# Patient Record
Sex: MALE | Race: OTHER | HISPANIC OR LATINO | URBAN - METROPOLITAN AREA
[De-identification: names, ages, dates, MRNs, and addresses within clinical notes are randomized per-mention and may not be internally consistent; named-entity substitution may affect disease eponyms.]

---

## 2020-12-09 ENCOUNTER — TRANSCRIBE ORDERS (OUTPATIENT)
Dept: URGENT CARE | Facility: MEDICAL CENTER | Age: 25
End: 2020-12-09

## 2020-12-09 ENCOUNTER — AMB VIDEO VISIT (OUTPATIENT)
Dept: OTHER | Facility: HOSPITAL | Age: 25
End: 2020-12-09

## 2020-12-09 DIAGNOSIS — Z11.59 SPECIAL SCREENING EXAMINATION FOR VIRAL DISEASE: ICD-10-CM

## 2020-12-09 DIAGNOSIS — Z11.59 SPECIAL SCREENING EXAMINATION FOR VIRAL DISEASE: Primary | ICD-10-CM

## 2020-12-09 PROCEDURE — EVISIT: Performed by: FAMILY MEDICINE

## 2020-12-09 PROCEDURE — U0003 INFECTIOUS AGENT DETECTION BY NUCLEIC ACID (DNA OR RNA); SEVERE ACUTE RESPIRATORY SYNDROME CORONAVIRUS 2 (SARS-COV-2) (CORONAVIRUS DISEASE [COVID-19]), AMPLIFIED PROBE TECHNIQUE, MAKING USE OF HIGH THROUGHPUT TECHNOLOGIES AS DESCRIBED BY CMS-2020-01-R: HCPCS | Performed by: FAMILY MEDICINE

## 2020-12-10 LAB — SARS-COV-2 RNA SPEC QL NAA+PROBE: NOT DETECTED

## 2022-02-19 ENCOUNTER — HOSPITAL ENCOUNTER (EMERGENCY)
Facility: HOSPITAL | Age: 27
Discharge: HOME | End: 2022-02-19
Attending: EMERGENCY MEDICINE | Admitting: EMERGENCY MEDICINE
Payer: COMMERCIAL

## 2022-02-19 VITALS
TEMPERATURE: 98.2 F | RESPIRATION RATE: 17 BRPM | OXYGEN SATURATION: 99 % | HEART RATE: 89 BPM | SYSTOLIC BLOOD PRESSURE: 99 MMHG | DIASTOLIC BLOOD PRESSURE: 59 MMHG

## 2022-02-19 DIAGNOSIS — R19.7 NAUSEA VOMITING AND DIARRHEA: ICD-10-CM

## 2022-02-19 DIAGNOSIS — K52.9 GASTROENTERITIS: Primary | ICD-10-CM

## 2022-02-19 DIAGNOSIS — R11.2 NAUSEA VOMITING AND DIARRHEA: ICD-10-CM

## 2022-02-19 LAB
ALBUMIN SERPL BCP-MCNC: 4.2 G/DL (ref 3.5–5)
ALP SERPL-CCNC: 74 U/L (ref 46–116)
ALT SERPL W P-5'-P-CCNC: 23 U/L (ref 12–78)
ANION GAP SERPL CALCULATED.3IONS-SCNC: 7 MMOL/L (ref 4–13)
AST SERPL W P-5'-P-CCNC: 20 U/L (ref 5–45)
BACTERIA UR QL AUTO: ABNORMAL /HPF
BASOPHILS # BLD AUTO: 0.05 THOUSANDS/ΜL (ref 0–0.1)
BASOPHILS NFR BLD AUTO: 0 % (ref 0–1)
BILIRUB SERPL-MCNC: 0.71 MG/DL (ref 0.2–1)
BILIRUB UR QL STRIP: NEGATIVE
BUN SERPL-MCNC: 12 MG/DL (ref 5–25)
CALCIUM SERPL-MCNC: 8.9 MG/DL (ref 8.3–10.1)
CHLORIDE SERPL-SCNC: 99 MMOL/L (ref 100–108)
CLARITY UR: CLEAR
CO2 SERPL-SCNC: 29 MMOL/L (ref 21–32)
COLOR UR: YELLOW
CREAT SERPL-MCNC: 1.26 MG/DL (ref 0.6–1.3)
EOSINOPHIL # BLD AUTO: 0 THOUSAND/ΜL (ref 0–0.61)
EOSINOPHIL NFR BLD AUTO: 0 % (ref 0–6)
ERYTHROCYTE [DISTWIDTH] IN BLOOD BY AUTOMATED COUNT: 11.9 % (ref 11.6–15.1)
FLUAV RNA RESP QL NAA+PROBE: NEGATIVE
FLUBV RNA RESP QL NAA+PROBE: NEGATIVE
GFR SERPL CREATININE-BSD FRML MDRD: 77 ML/MIN/1.73SQ M
GLUCOSE SERPL-MCNC: 112 MG/DL (ref 65–140)
GLUCOSE UR STRIP-MCNC: NEGATIVE MG/DL
HCT VFR BLD AUTO: 42.2 % (ref 36.5–49.3)
HGB BLD-MCNC: 14.6 G/DL (ref 12–17)
HGB UR QL STRIP.AUTO: ABNORMAL
IMM GRANULOCYTES # BLD AUTO: 0.11 THOUSAND/UL (ref 0–0.2)
IMM GRANULOCYTES NFR BLD AUTO: 1 % (ref 0–2)
KETONES UR STRIP-MCNC: ABNORMAL MG/DL
LEUKOCYTE ESTERASE UR QL STRIP: NEGATIVE
LIPASE SERPL-CCNC: 38 U/L (ref 73–393)
LYMPHOCYTES # BLD AUTO: 0.88 THOUSANDS/ΜL (ref 0.6–4.47)
LYMPHOCYTES NFR BLD AUTO: 5 % (ref 14–44)
MCH RBC QN AUTO: 31.1 PG (ref 26.8–34.3)
MCHC RBC AUTO-ENTMCNC: 34.6 G/DL (ref 31.4–37.4)
MCV RBC AUTO: 90 FL (ref 82–98)
MONOCYTES # BLD AUTO: 0.78 THOUSAND/ΜL (ref 0.17–1.22)
MONOCYTES NFR BLD AUTO: 5 % (ref 4–12)
NEUTROPHILS # BLD AUTO: 15.5 THOUSANDS/ΜL (ref 1.85–7.62)
NEUTS SEG NFR BLD AUTO: 89 % (ref 43–75)
NITRITE UR QL STRIP: NEGATIVE
NON-SQ EPI CELLS URNS QL MICRO: ABNORMAL /HPF
NRBC BLD AUTO-RTO: 0 /100 WBCS
PH UR STRIP.AUTO: 7 [PH] (ref 4.5–8)
PLATELET # BLD AUTO: 175 THOUSANDS/UL (ref 149–390)
PMV BLD AUTO: 12.4 FL (ref 8.9–12.7)
POTASSIUM SERPL-SCNC: 3.7 MMOL/L (ref 3.5–5.3)
PROT SERPL-MCNC: 8.4 G/DL (ref 6.4–8.2)
PROT UR STRIP-MCNC: NEGATIVE MG/DL
RBC # BLD AUTO: 4.69 MILLION/UL (ref 3.88–5.62)
RBC #/AREA URNS AUTO: ABNORMAL /HPF
SARS-COV-2 RNA RESP QL NAA+PROBE: NEGATIVE
SODIUM SERPL-SCNC: 135 MMOL/L (ref 136–145)
SP GR UR STRIP.AUTO: 1.01 (ref 1–1.03)
UROBILINOGEN UR QL STRIP.AUTO: 0.2 E.U./DL
WBC # BLD AUTO: 17.32 THOUSAND/UL (ref 4.31–10.16)
WBC #/AREA URNS AUTO: ABNORMAL /HPF

## 2022-02-19 PROCEDURE — 85025 COMPLETE CBC W/AUTO DIFF WBC: CPT | Performed by: PHYSICIAN ASSISTANT

## 2022-02-19 PROCEDURE — 99283 EMERGENCY DEPT VISIT LOW MDM: CPT

## 2022-02-19 PROCEDURE — 81001 URINALYSIS AUTO W/SCOPE: CPT

## 2022-02-19 PROCEDURE — 36415 COLL VENOUS BLD VENIPUNCTURE: CPT | Performed by: PHYSICIAN ASSISTANT

## 2022-02-19 PROCEDURE — 96375 TX/PRO/DX INJ NEW DRUG ADDON: CPT

## 2022-02-19 PROCEDURE — 87636 SARSCOV2 & INF A&B AMP PRB: CPT | Performed by: PHYSICIAN ASSISTANT

## 2022-02-19 PROCEDURE — 80053 COMPREHEN METABOLIC PANEL: CPT | Performed by: PHYSICIAN ASSISTANT

## 2022-02-19 PROCEDURE — 96361 HYDRATE IV INFUSION ADD-ON: CPT

## 2022-02-19 PROCEDURE — 83690 ASSAY OF LIPASE: CPT | Performed by: PHYSICIAN ASSISTANT

## 2022-02-19 PROCEDURE — 96374 THER/PROPH/DIAG INJ IV PUSH: CPT

## 2022-02-19 PROCEDURE — 99284 EMERGENCY DEPT VISIT MOD MDM: CPT | Performed by: PHYSICIAN ASSISTANT

## 2022-02-19 RX ORDER — ONDANSETRON 4 MG/1
4 TABLET, ORALLY DISINTEGRATING ORAL EVERY 6 HOURS PRN
Qty: 20 TABLET | Refills: 0 | Status: SHIPPED | OUTPATIENT
Start: 2022-02-19

## 2022-02-19 RX ORDER — ACETAMINOPHEN 325 MG/1
975 TABLET ORAL ONCE
Status: COMPLETED | OUTPATIENT
Start: 2022-02-19 | End: 2022-02-19

## 2022-02-19 RX ORDER — ONDANSETRON 2 MG/ML
4 INJECTION INTRAMUSCULAR; INTRAVENOUS ONCE
Status: COMPLETED | OUTPATIENT
Start: 2022-02-19 | End: 2022-02-19

## 2022-02-19 RX ORDER — KETOROLAC TROMETHAMINE 30 MG/ML
15 INJECTION, SOLUTION INTRAMUSCULAR; INTRAVENOUS ONCE
Status: COMPLETED | OUTPATIENT
Start: 2022-02-19 | End: 2022-02-19

## 2022-02-19 RX ADMIN — SODIUM CHLORIDE 1000 ML: 0.9 INJECTION, SOLUTION INTRAVENOUS at 01:37

## 2022-02-19 RX ADMIN — ACETAMINOPHEN 975 MG: 325 TABLET, FILM COATED ORAL at 01:36

## 2022-02-19 RX ADMIN — ONDANSETRON 4 MG: 2 INJECTION INTRAMUSCULAR; INTRAVENOUS at 01:37

## 2022-02-19 RX ADMIN — KETOROLAC TROMETHAMINE 15 MG: 30 INJECTION, SOLUTION INTRAMUSCULAR at 01:36

## 2022-02-19 NOTE — DISCHARGE INSTRUCTIONS
You were seen in the emergency department today for nausea, vomiting, and diarrhea  Please follow-up with your primary care physician regarding your symptoms  Return to the Emergency Department sooner if increased pain, fever, vomiting, diarrhea, difficulty breathing or urinating, bleeding  Clear fluids for 1-2 days and then advance diet as tolerated  Zofran as needed for nausea and vomiting  Plenty of fluids, bland diet

## 2022-02-19 NOTE — RESULT ENCOUNTER NOTE
Attempted to call regarding negative COVID/flu results  No Answer   Left generic message in Portuguese

## 2022-02-19 NOTE — ED NOTES
Patient rang out requesting food   Provided him with johny Albert and water     Feliz Grimes RN  02/19/22 6686

## 2022-02-19 NOTE — ED PROVIDER NOTES
History  Chief Complaint   Patient presents with    Fatigue     pt reports fatigue, generalized body aches, nausea, fever and chills, no medications PTA     Dylan Mcclain is a 27 y o   male with no significant pmh who presents to the emergency department with generalized body aches, chills, nausea, vomiting, and diarrhea  The patient is accompanied by significant other who provides Khmer translation at patients request  The patient states that on Sunday evening he started to have episodes of NBNB vomit as well as cramping abdominal pain  He states through the week he has had intermittent vomiting and abdominal pain with associated diarrhea beginning Monday/tuesday  Symptoms are intermittent  Last episode of vomit occurred 2 days prior  Diarrhea occurs about 2-3 times a day  Admits to a gradual onset headache that started earlier today  Headache is generalized and like a pressure  Like previous headaches  No vision changes, weakness, paresthesias  He states he has been able to tolerate some PO intake but limits himself out of fear of return of symptoms  Denies any blood in the vomit of stool  Admits to muscle aches and generally feeling run down  No medications prior to arrival  No urinary complaints   No known PMH            History provided by:  Patient   used: No    Vomiting  Severity:  Moderate  Duration:  5 days  Timing:  Intermittent  Number of daily episodes:  3-4  Quality:  Stomach contents  Able to tolerate:  Liquids and solids  Progression:  Worsening  Chronicity:  New  Recent urination:  Normal  Context: not post-tussive and not self-induced    Relieved by:  Nothing  Ineffective treatments:  None tried  Associated symptoms: abdominal pain, chills, headaches and myalgias    Associated symptoms: no arthralgias, no cough, no diarrhea, no fever, no sore throat and no URI    Abdominal pain:     Location:  Generalized    Quality: cramping      Severity:  Mild    Onset quality: Gradual    Timing:  Intermittent  Headaches:     Severity:  Mild    Onset quality:  Gradual    Duration:  1 day    Timing:  Constant    Progression:  Unchanged    Chronicity:  New  Myalgias:     Location:  Generalized    Quality:  Aching    Severity:  Mild    Onset quality:  Gradual    Timing:  Constant      None       History reviewed  No pertinent past medical history  No past surgical history on file  History reviewed  No pertinent family history  I have reviewed and agree with the history as documented  E-Cigarette/Vaping     E-Cigarette/Vaping Substances     Social History     Tobacco Use    Smoking status: Not on file    Smokeless tobacco: Not on file   Substance Use Topics    Alcohol use: Not on file    Drug use: Not on file       Review of Systems   Constitutional: Positive for chills  Negative for activity change, appetite change, fever and unexpected weight change  HENT: Negative for congestion, ear discharge, postnasal drip, rhinorrhea, sinus pressure, sinus pain and sore throat  Respiratory: Negative for apnea, cough, choking, chest tightness, shortness of breath, wheezing and stridor  Cardiovascular: Negative for chest pain, palpitations and leg swelling  Gastrointestinal: Positive for abdominal pain and vomiting  Negative for blood in stool, constipation, diarrhea and nausea  Genitourinary: Negative for dysuria, flank pain, frequency and urgency  Musculoskeletal: Positive for myalgias  Negative for arthralgias and neck stiffness  Skin: Negative for color change, pallor, rash and wound  Neurological: Positive for headaches  Negative for dizziness, tremors, seizures, syncope, light-headedness and numbness  All other systems reviewed and are negative  Physical Exam  Physical Exam  Vitals and nursing note reviewed  Constitutional:       General: He is not in acute distress  Appearance: Normal appearance  He is normal weight   He is not ill-appearing or toxic-appearing  HENT:      Head: Normocephalic and atraumatic  Nose: Nose normal       Mouth/Throat:      Mouth: Mucous membranes are moist       Pharynx: Oropharynx is clear  No oropharyngeal exudate  Eyes:      Pupils: Pupils are equal, round, and reactive to light  Cardiovascular:      Rate and Rhythm: Normal rate and regular rhythm  Pulses: Normal pulses  Heart sounds: Normal heart sounds  No murmur heard  No friction rub  No gallop  Pulmonary:      Effort: Pulmonary effort is normal  No respiratory distress  Breath sounds: Normal breath sounds  No stridor  No wheezing, rhonchi or rales  Abdominal:      General: Abdomen is flat  Bowel sounds are normal  There is no distension  Palpations: Abdomen is soft  There is no mass  Tenderness: There is no abdominal tenderness  There is no right CVA tenderness, left CVA tenderness, guarding or rebound  Hernia: No hernia is present  Musculoskeletal:         General: No swelling, tenderness or deformity  Normal range of motion  Cervical back: Normal range of motion  No rigidity or tenderness  Skin:     General: Skin is warm and dry  Capillary Refill: Capillary refill takes less than 2 seconds  Neurological:      General: No focal deficit present  Mental Status: He is alert and oriented to person, place, and time  Mental status is at baseline  Cranial Nerves: No cranial nerve deficit  Sensory: No sensory deficit  Motor: No weakness  Comments: GCS 15  CN 2-12 intact  PERRLA  Bilateral upper and lower extremities have 5/5 strength and sensation is intact  Finger to Nose and Heel to shin are intact   Speech normal                Vital Signs  ED Triage Vitals   Temperature Pulse Respirations Blood Pressure SpO2   02/19/22 0048 02/19/22 0048 02/19/22 0048 02/19/22 0048 02/19/22 0048   98 4 °F (36 9 °C) 105 16 102/55 99 %      Temp Source Heart Rate Source Patient Position - Orthostatic VS BP Location FiO2 (%)   02/19/22 0251 02/19/22 0251 02/19/22 0251 02/19/22 0251 --   Oral Monitor Sitting Left arm       Pain Score       02/19/22 0136       7           Vitals:    02/19/22 0048 02/19/22 0251   BP: 102/55 99/59   Pulse: 105 89   Patient Position - Orthostatic VS:  Sitting         Visual Acuity      ED Medications  Medications   sodium chloride 0 9 % bolus 1,000 mL (0 mL Intravenous Stopped 2/19/22 0251)   ondansetron (ZOFRAN) injection 4 mg (4 mg Intravenous Given 2/19/22 0137)   acetaminophen (TYLENOL) tablet 975 mg (975 mg Oral Given 2/19/22 0136)   ketorolac (TORADOL) injection 15 mg (15 mg Intravenous Given 2/19/22 0136)       Diagnostic Studies  Results Reviewed     Procedure Component Value Units Date/Time    Urine Microscopic [[id number]]  (Abnormal) Collected: 02/19/22 0142    Lab Status: Final result Specimen: Urine, Clean Catch Updated: 02/19/22 0238     RBC, UA 0-1 /hpf      WBC, UA None Seen /hpf      Epithelial Cells None Seen /hpf      Bacteria, UA None Seen /hpf     Comprehensive metabolic panel [[id number]]  (Abnormal) Collected: 02/19/22 0134    Lab Status: Final result Specimen: Blood from Arm, Right Updated: 02/19/22 0207     Sodium 135 mmol/L      Potassium 3 7 mmol/L      Chloride 99 mmol/L      CO2 29 mmol/L      ANION GAP 7 mmol/L      BUN 12 mg/dL      Creatinine 1 26 mg/dL      Glucose 112 mg/dL      Calcium 8 9 mg/dL      AST 20 U/L      ALT 23 U/L      Alkaline Phosphatase 74 U/L      Total Protein 8 4 g/dL      Albumin 4 2 g/dL      Total Bilirubin 0 71 mg/dL      eGFR 77 ml/min/1 73sq m     Narrative:      [place] guidelines for Chronic Kidney Disease (CKD):     Stage 1 with normal or high GFR (GFR > 90 mL/min/1 73 square meters)    Stage 2 Mild CKD (GFR = 60-89 mL/min/1 73 square meters)    Stage 3A Moderate CKD (GFR = 45-59 mL/min/1 73 square meters)    Stage 3B Moderate CKD (GFR = 30-44 mL/min/1 73 square meters)    Stage 4 Severe CKD (GFR = 15-29 mL/min/1 73 square meters)    Stage 5 End Stage CKD (GFR <15 mL/min/1 73 square meters)  Note: GFR calculation is accurate only with a steady state creatinine    Lipase [[id number]]  (Abnormal) Collected: 02/19/22 0134    Lab Status: Final result Specimen: Blood from Arm, Right Updated: 02/19/22 0207     Lipase 38 u/L     CBC and differential [[id number]]  (Abnormal) Collected: 02/19/22 0134    Lab Status: Final result Specimen: Blood from Arm, Right Updated: 02/19/22 0145     WBC 17 32 Thousand/uL      RBC 4 69 Million/uL      Hemoglobin 14 6 g/dL      Hematocrit 42 2 %      MCV 90 fL      MCH 31 1 pg      MCHC 34 6 g/dL      RDW 11 9 %      MPV 12 4 fL      Platelets 835 Thousands/uL      nRBC 0 /100 WBCs      Neutrophils Relative 89 %      Immat GRANS % 1 %      Lymphocytes Relative 5 %      Monocytes Relative 5 %      Eosinophils Relative 0 %      Basophils Relative 0 %      Neutrophils Absolute 15 50 Thousands/µL      Immature Grans Absolute 0 11 Thousand/uL      Lymphocytes Absolute 0 88 Thousands/µL      Monocytes Absolute 0 78 Thousand/µL      Eosinophils Absolute 0 00 Thousand/µL      Basophils Absolute 0 05 Thousands/µL     Urine Macroscopic, POC [[id number]]  (Abnormal) Collected: 02/19/22 0142    Lab Status: Final result Specimen: Urine Updated: 02/19/22 0143     Color, UA Yellow     Clarity, UA Clear     pH, UA 7 0     Leukocytes, UA Negative     Nitrite, UA Negative     Protein, UA Negative mg/dl      Glucose, UA Negative mg/dl      Ketones, UA 15 (1+) mg/dl      Urobilinogen, UA 0 2 E U /dl      Bilirubin, UA Negative     Blood, UA Small     Specific [place], UA 1 015    Narrative:      [facility] RESULT    COVID/FLU - 24 hour TAT [[id number]] Collected: 02/19/22 0134    Lab Status:  In process Specimen: Nares from Nose Updated: 02/19/22 0142                 No orders to display              Procedures  Procedures         ED Course                                             MDM  Number of Diagnoses or Management Options  Gastroenteritis: new and requires workup  Nausea vomiting and diarrhea: new and requires workup  Diagnosis management comments: Patient was seen and examined  in the emergency department for chief complaint of nausea, vomiting, diarrhea, generalized cramping abdominal pain, and myalgias  The patient presented with symptoms had been ongoing for since Sunday  Intermittent vomiting since Sunday  Intermittent diarrhea  Chills without fevers  Intermittent generalized cramping abdominal pain  Admits to mild headache that is like previous headaches  No blurry vision, weakness, paresthesias  No previous history of same  No sick contacts  Not vaccination against COVID  Distally were tolerate both solids and liquids with this care to eat a large meal out of fear of symptoms  On exam patient is in no acute distress, lungs are clear, regular rate and rhythm, no murmurs rubs or gallops  Abdomen soft nontender nondistended  No CVA tenderness  No rashes  DDx including but not limited to: food poisoning, viral illness, metabolic abnormality, dehydration,, GI etiology, UTI; doubt acute surgical intraabdominal process, Boorhave's syndrome, mediastinitis  Workup: Will check CBC, CMP, lipase, urinalysis, as COVID swab  Will treat symptomatically with Tylenol, Toradol, fluids  Mild leukocytosis  I did discuss follow-up imaging of the abdomen based on symptoms however patient declined this point is he is feeling much better and actually requesting food  Will p o  Challenge  P o  Challenge successful without any vomiting  I discussed return precautions for worsening abdominal pain, vomiting, diarrhea  Patient expressed understanding and remains committed to forgoing CT scan  At this point will discharge with PCP follow-up  Disposition:  General impression 24-year-old male with likely gastroenteritis causing nausea, vomiting, diarrhea    Patient symptomatically feeling much better at the time discharge and had successful p o  Challenge  Recommend follow-up with PCP for repeat exam and return for worsening symptoms  Patient declined further imaging at this point  Discharged in stable condition, he ambulated off the department  The patient was discharged in stable condition  Patient ambulated off the department  Extensive return to emergency department precautions were discussed  Follow up with appropriate providers including primary care physician was discussed  Patient and/or their  primary decision maker expressed understanding  Patient remained stable during entire emergency department stay  Amount and/or Complexity of Data Reviewed  Clinical lab tests: ordered and reviewed  Tests in the medicine section of CPT®: ordered and reviewed  Review and summarize past medical records: yes    Risk of Complications, Morbidity, and/or Mortality  Presenting problems: moderate  Diagnostic procedures: low  Management options: low    Patient Progress  Patient progress: stable      Disposition  Final diagnoses:   Gastroenteritis   Nausea vomiting and diarrhea     Time reflects when diagnosis was documented in both MDM as applicable and the Disposition within this note     Time User Action Codes Description Comment    2/19/2022  2:50 AM Kylie Kang [K52 9] Gastroenteritis     2/19/2022  2:50 AM Kylie Fair Add [R11 2,  R19 7] Nausea vomiting and diarrhea       ED Disposition     ED Disposition Condition Date/Time Comment    Discharge Stable Sat Feb 19, 2022  2:50 AM Yusef Ferrer discharge to home/self care              Follow-up Information     Follow up With Specialties Details Why Contact Info Additional [address] [facility] Emergency Medicine Go to  As needed, If symptoms worsen [place] [id number]  [address] [facility], [address] , [place], [place], [place]  Call  To schedule an appointment with a primary care physician [phone]             Discharge Medication List as of 2/19/2022  2:54 AM      START taking these medications    Details   ondansetron (Zofran ODT) 4 mg disintegrating tablet Take 1 tablet (4 mg total) by mouth every 6 (six) hours as needed for nausea or vomiting, Starting Sat 2/19/2022, Normal             No discharge procedures on file      PDMP Review     None          ED Provider  Electronically Signed by           Jo Ann Delgadillo PA-C  02/19/22 5689

## 2022-02-20 ENCOUNTER — OFFICE VISIT (OUTPATIENT)
Dept: URGENT CARE | Age: 27
End: 2022-02-20
Payer: COMMERCIAL

## 2022-02-20 VITALS
HEIGHT: 69 IN | WEIGHT: 128 LBS | RESPIRATION RATE: 18 BRPM | TEMPERATURE: 101 F | HEART RATE: 82 BPM | BODY MASS INDEX: 18.96 KG/M2 | OXYGEN SATURATION: 97 %

## 2022-02-20 DIAGNOSIS — J02.9 SORE THROAT: ICD-10-CM

## 2022-02-20 DIAGNOSIS — J03.00 ACUTE STREPTOCOCCAL TONSILLITIS, NOT SPECIFIED AS RECURRENT OR NOT: Primary | ICD-10-CM

## 2022-02-20 LAB — S PYO AG THROAT QL: POSITIVE

## 2022-02-20 PROCEDURE — 99213 OFFICE O/P EST LOW 20 MIN: CPT | Performed by: PHYSICIAN ASSISTANT

## 2022-02-20 PROCEDURE — 87880 STREP A ASSAY W/OPTIC: CPT | Performed by: PHYSICIAN ASSISTANT

## 2022-02-20 RX ORDER — AMOXICILLIN 500 MG/1
500 CAPSULE ORAL EVERY 12 HOURS SCHEDULED
Qty: 20 CAPSULE | Refills: 0 | Status: SHIPPED | OUTPATIENT
Start: 2022-02-20 | End: 2022-03-02

## 2022-02-21 NOTE — PROGRESS NOTES
[employer]        NAME: Sarbjit Castillo is a 27 y o  male  : 1995    MRN: [id number]  DATE: 2022  TIME: 8:33 AM    Assessment and Plan   Acute streptococcal tonsillitis, not specified as recurrent or not [J03 00]  1  Acute streptococcal tonsillitis, not specified as recurrent or not  amoxicillin (AMOXIL) 500 mg capsule    dexamethasone oral liquid 10 mg 1 mL   2  Sore throat  POCT rapid strepA   Pt presents with complaints of sore throat and examination concerning for strep  Rapid strep in the clinic is positive  Pt will be started on Amoxicillin to treat as this is the treatment of choice for strep  Pt allergies were reviewed and they have no allergy to penicillins  We also discussed Decadron and associated side effects to help reduce tonsilar swelling and throat pain  Pt is instructed that they are considered contagious until they have been on antibiotics for 24 hours  They should not attend work or school during that time  The patient is provided with a note(s) to this effect  We discussed that after they have been on antibiotics 48 hours they need to throw away their current toothbrush and replace with a new toothbrush to prevent re-colonizing themselves with strep bacteria  It was discussed with the patient that I cannot rule out more serious conditions such as peritonsilar, tonsilar, and retropharyngeal abscess at this location that would require ED evaluation; however serious, these are rare conditions and unlikely based on my examination and I do not recommend ED evaluation at this time  The patient will follow-up with their PCP in 2-3 days if symptoms are not improved on antibiotics  They will report to the emergency room if symptoms worsen or new symptoms such as jaw pain, difficulty swallowing, anterior neck pain, or hoarseness of voice develop  Patient Instructions     Patient Instructions   Take antibiotics as directed     You are contagious until you have been on the antibiotics for 24 hours, do not share drink, food, or kiss on the mouth during that time  Get a new toothbrush after you have been on the antibiotics for 2 days so you do not recolonize yourself with bacteria  If symptoms do not improve on antibiotics in 2-3 days, you get a rash on the antibiotic, or develop jaw pain, worsening neck pain, swelling of the neck, or difficulty swallowing, report to the emergency department  Strep Throat   AMBULATORY CARE:   Strep throat  is a throat infection caused by bacteria  It is easily spread from person to person  Common symptoms include the following:   · Sore, red, and swollen throat    · Fever and headache     · Upset stomach, abdominal pain, or vomiting    · White or yellow patches or blisters in the back of your throat    · Tender, swollen lumps on the sides of your neck or jaw    · Throat pain when you swallow    Call 911 for any of the following:   · You have trouble breathing  Seek care immediately if:   · You have new symptoms like a bad headache, stiff neck, chest pain, or vomiting  · You are drooling because you cannot swallow your spit  Contact your healthcare provider if:   · You have a fever  · You have a rash or ear pain  · You have green, yellow-brown, or bloody mucus when you cough or blow your nose  · You are unable to drink anything  · You have questions or concerns about your condition or care  Treatment for strep throat  may include antibiotic medicine to treat your strep throat  You should feel better within 2 to 3 days after you start antibiotics  You may return to work or school 24 hours after you start antibiotics  Manage strep throat:   · Use lozenges, ice, soft foods, or popsicles  to soothe your throat  · Drink juice, milk shakes, or soup  if your throat is too sore to eat solid food  Drinking liquids can also help prevent dehydration  · Gargle with salt water    Mix ¼ teaspoon salt in a glass of warm water and gargle  This may help reduce swelling in your throat  · Do not smoke  Nicotine and other chemicals in cigarettes and cigars can cause lung damage and make your symptoms worse  Ask your healthcare provider for information if you currently smoke and need help to quit  E-cigarettes or smokeless tobacco still contain nicotine  Talk to your healthcare provider before you use these products  Prevent the spread of strep throat:   · Wash your hands often  Use soap and water  Wash your hands after you use the bathroom, change a child's diapers, or sneeze  Wash your hands before you prepare or eat food  · Do not share food or drinks  Replace your toothbrush after you have taken antibiotics for 24 hours  Follow up with your doctor as directed:  Write down your questions so you remember to ask them during your visits  © Copyright [employer] 2021 Information is for End User's use only and may not be sold, redistributed or otherwise used for commercial purposes  All illustrations and images included in CareNotes® are the copyrighted property of [employer] Inc  or [facility]  The above information is an  only  It is not intended as medical advice for individual conditions or treatments  Talk to your doctor, nurse or pharmacist before following any medical regimen to see if it is safe and effective for you  Follow up with PCP in 3-5 days  Proceed to  ER if symptoms worsen  Chief Complaint     Chief Complaint   Patient presents with    Sore Throat     sore throat began yesterday         History of Present Illness       24-year-old male presents with complaint of sore throat that began this morning  Patient reports that he has had fevers off and on for the last week  He also has a mild cough and had some congestion last week  Patient was tested for COVID yesterday evening in the ER and was negative for both COVID and flu    Patient notes there are several spots on his tonsils and he is having a lot of pain swallowing  Patient denies difficulty swallowing and voice changes as well as jaw pain  He feels that his glands are swollen and is having some mild anterior neck pain  No other concerns or complaints today  Review of Systems   Review of Systems   Constitutional: Positive for fatigue and fever  HENT: Positive for sore throat  Negative for congestion, drooling, postnasal drip, rhinorrhea, trouble swallowing and voice change  Respiratory: Negative for cough and shortness of breath  Cardiovascular: Negative for chest pain  Gastrointestinal: Positive for nausea  Negative for diarrhea and vomiting  Musculoskeletal: Negative for myalgias  Neurological: Negative for headaches  Current Medications       Current Outpatient Medications:     ondansetron (Zofran ODT) 4 mg disintegrating tablet, Take 1 tablet (4 mg total) by mouth every 6 (six) hours as needed for nausea or vomiting, Disp: 20 tablet, Rfl: 0    amoxicillin (AMOXIL) 500 mg capsule, Take 1 capsule (500 mg total) by mouth every 12 (twelve) hours for 10 days, Disp: 20 capsule, Rfl: 0  No current facility-administered medications for this visit  Current Allergies     Allergies as of 02/20/2022    (No Known Allergies)            The following portions of the patient's history were reviewed and updated as appropriate: allergies, current medications, past family history, past medical history, past social history, past surgical history and problem list      History reviewed  No pertinent past medical history  History reviewed  No pertinent surgical history  History reviewed  No pertinent family history  Medications have been verified  Objective   Pulse 82   Temp (!) 101 °F (38 3 °C)   Resp 18   Ht 5' 9" (1 753 m)   Wt 58 1 kg (128 lb)   SpO2 97%   BMI 18 90 kg/m²   No LMP for male patient  Physical Exam     Physical Exam  Vitals and nursing note reviewed  Constitutional:       General: He is awake  He is not in acute distress  Appearance: Normal appearance  He is well-developed and well-groomed  He is not ill-appearing, toxic-appearing or diaphoretic  HENT:      Head: Normocephalic and atraumatic  Jaw: No trismus  Right Ear: Hearing, tympanic membrane, ear canal and external ear normal  There is no impacted cerumen  No foreign body  Left Ear: Hearing, tympanic membrane, ear canal and external ear normal  There is no impacted cerumen  No foreign body  Nose: Nose normal  No mucosal edema, congestion or rhinorrhea  Right Nostril: No foreign body, epistaxis or occlusion  Left Nostril: No foreign body, epistaxis or occlusion  Right Turbinates: Not enlarged, swollen or pale  Left Turbinates: Not enlarged, swollen or pale  Mouth/Throat:      Lips: Pink  No lesions  Mouth: Mucous membranes are moist  No injury, oral lesions or angioedema  Dentition: Normal dentition  Tongue: No lesions  Tongue does not deviate from midline  Palate: No mass and lesions  Pharynx: Uvula midline  No pharyngeal swelling, oropharyngeal exudate, posterior oropharyngeal erythema or uvula swelling  Tonsils: Tonsillar exudate present  No tonsillar abscesses  3+ on the right  3+ on the left  Eyes:      General: Lids are normal  Vision grossly intact  Gaze aligned appropriately  Neck:      Trachea: Trachea and phonation normal  No tracheal tenderness or tracheal deviation  Cardiovascular:      Rate and Rhythm: Normal rate  Pulmonary:      Effort: Pulmonary effort is normal       Comments: Patient is speaking in full sentences with no increased respiratory effort  No audible wheezing or stridor  Musculoskeletal:      Cervical back: Normal range of motion  Lymphadenopathy:      Cervical: Cervical adenopathy present  Right cervical: Superficial cervical adenopathy present   No deep or posterior cervical adenopathy  Left cervical: Superficial cervical adenopathy present  No deep or posterior cervical adenopathy  Skin:     General: Skin is warm and dry  Neurological:      Mental Status: He is alert and oriented to person, place, and time  Coordination: Coordination is intact  Gait: Gait is intact  Psychiatric:         Attention and Perception: Attention and perception normal          Mood and Affect: Mood and affect normal          Speech: Speech normal          Behavior: Behavior normal  Behavior is cooperative  Note: Portions of this record may have been created with voice recognition software  Occasional wrong word or "sound a like" substitutions may have occurred due to the inherent limitations of voice recognition software  Please read the chart carefully and recognize, using context, where substitutions have occurred  *

## 2022-02-21 NOTE — PATIENT INSTRUCTIONS
Take antibiotics as directed  You are contagious until you have been on the antibiotics for 24 hours, do not share drink, food, or kiss on the mouth during that time  Get a new toothbrush after you have been on the antibiotics for 2 days so you do not recolonize yourself with bacteria  If symptoms do not improve on antibiotics in 2-3 days, you get a rash on the antibiotic, or develop jaw pain, worsening neck pain, swelling of the neck, or difficulty swallowing, report to the emergency department  Strep Throat   AMBULATORY CARE:   Strep throat  is a throat infection caused by bacteria  It is easily spread from person to person  Common symptoms include the following:   · Sore, red, and swollen throat    · Fever and headache     · Upset stomach, abdominal pain, or vomiting    · White or yellow patches or blisters in the back of your throat    · Tender, swollen lumps on the sides of your neck or jaw    · Throat pain when you swallow    Call 911 for any of the following:   · You have trouble breathing  Seek care immediately if:   · You have new symptoms like a bad headache, stiff neck, chest pain, or vomiting  · You are drooling because you cannot swallow your spit  Contact your healthcare provider if:   · You have a fever  · You have a rash or ear pain  · You have green, yellow-brown, or bloody mucus when you cough or blow your nose  · You are unable to drink anything  · You have questions or concerns about your condition or care  Treatment for strep throat  may include antibiotic medicine to treat your strep throat  You should feel better within 2 to 3 days after you start antibiotics  You may return to work or school 24 hours after you start antibiotics  Manage strep throat:   · Use lozenges, ice, soft foods, or popsicles  to soothe your throat  · Drink juice, milk shakes, or soup  if your throat is too sore to eat solid food   Drinking liquids can also help prevent dehydration  · Gargle with salt water  Mix ¼ teaspoon salt in a glass of warm water and gargle  This may help reduce swelling in your throat  · Do not smoke  Nicotine and other chemicals in cigarettes and cigars can cause lung damage and make your symptoms worse  Ask your healthcare provider for information if you currently smoke and need help to quit  E-cigarettes or smokeless tobacco still contain nicotine  Talk to your healthcare provider before you use these products  Prevent the spread of strep throat:   · Wash your hands often  Use soap and water  Wash your hands after you use the bathroom, change a child's diapers, or sneeze  Wash your hands before you prepare or eat food  · Do not share food or drinks  Replace your toothbrush after you have taken antibiotics for 24 hours  Follow up with your doctor as directed:  Write down your questions so you remember to ask them during your visits  © [employer] 2021 Information is for End User's use only and may not be sold, redistributed or otherwise used for commercial purposes  All illustrations and images included in CareNotes® are the copyrighted property of [employer]  or [facility] Erica Mchugh   The above information is an  only  It is not intended as medical advice for individual conditions or treatments  Talk to your doctor, nurse or pharmacist before following any medical regimen to see if it is safe and effective for you

## 2024-07-18 ENCOUNTER — APPOINTMENT (EMERGENCY)
Dept: RADIOLOGY | Facility: HOSPITAL | Age: 29
End: 2024-07-18
Payer: COMMERCIAL

## 2024-07-18 ENCOUNTER — OCCMED (OUTPATIENT)
Dept: URGENT CARE | Facility: MEDICAL CENTER | Age: 29
End: 2024-07-18
Payer: OTHER MISCELLANEOUS

## 2024-07-18 ENCOUNTER — HOSPITAL ENCOUNTER (EMERGENCY)
Facility: HOSPITAL | Age: 29
Discharge: HOME | End: 2024-07-19
Attending: EMERGENCY MEDICINE
Payer: COMMERCIAL

## 2024-07-18 DIAGNOSIS — R00.1 BRADYCARDIA: ICD-10-CM

## 2024-07-18 DIAGNOSIS — M54.50 LOW BACK PAIN: ICD-10-CM

## 2024-07-18 DIAGNOSIS — R55 SYNCOPE: Primary | ICD-10-CM

## 2024-07-18 DIAGNOSIS — R55 SYNCOPE AND COLLAPSE: Primary | ICD-10-CM

## 2024-07-18 LAB
ALBUMIN SERPL BCG-MCNC: 4.4 G/DL (ref 3.5–5)
ALP SERPL-CCNC: 77 U/L (ref 34–104)
ALT SERPL W P-5'-P-CCNC: 12 U/L (ref 7–52)
ANION GAP SERPL CALCULATED.3IONS-SCNC: 6 MMOL/L (ref 4–13)
AST SERPL W P-5'-P-CCNC: 20 U/L (ref 13–39)
ATRIAL RATE: 54 BPM
BASOPHILS # BLD AUTO: 0.04 THOUSANDS/ÂΜL (ref 0–0.1)
BASOPHILS NFR BLD AUTO: 0 % (ref 0–1)
BILIRUB SERPL-MCNC: 0.4 MG/DL (ref 0.2–1)
BUN SERPL-MCNC: 15 MG/DL (ref 5–25)
CALCIUM SERPL-MCNC: 9.2 MG/DL (ref 8.4–10.2)
CARDIAC TROPONIN I PNL SERPL HS: <2 NG/L
CHLORIDE SERPL-SCNC: 104 MMOL/L (ref 96–108)
CO2 SERPL-SCNC: 28 MMOL/L (ref 21–32)
CREAT SERPL-MCNC: 1.02 MG/DL (ref 0.6–1.3)
EOSINOPHIL # BLD AUTO: 0.04 THOUSAND/ÂΜL (ref 0–0.61)
EOSINOPHIL NFR BLD AUTO: 0 % (ref 0–6)
ERYTHROCYTE [DISTWIDTH] IN BLOOD BY AUTOMATED COUNT: 12.1 % (ref 11.6–15.1)
GFR SERPL CREATININE-BSD FRML MDRD: 98 ML/MIN/1.73SQ M
GLUCOSE SERPL-MCNC: 95 MG/DL (ref 65–140)
HCT VFR BLD AUTO: 41.7 % (ref 36.5–49.3)
HGB BLD-MCNC: 14.2 G/DL (ref 12–17)
IMM GRANULOCYTES # BLD AUTO: 0.03 THOUSAND/UL (ref 0–0.2)
IMM GRANULOCYTES NFR BLD AUTO: 0 % (ref 0–2)
LYMPHOCYTES # BLD AUTO: 2.92 THOUSANDS/ÂΜL (ref 0.6–4.47)
LYMPHOCYTES NFR BLD AUTO: 30 % (ref 14–44)
MAGNESIUM SERPL-MCNC: 2.2 MG/DL (ref 1.9–2.7)
MCH RBC QN AUTO: 30.4 PG (ref 26.8–34.3)
MCHC RBC AUTO-ENTMCNC: 34.1 G/DL (ref 31.4–37.4)
MCV RBC AUTO: 89 FL (ref 82–98)
MONOCYTES # BLD AUTO: 0.71 THOUSAND/ÂΜL (ref 0.17–1.22)
MONOCYTES NFR BLD AUTO: 7 % (ref 4–12)
NEUTROPHILS # BLD AUTO: 5.93 THOUSANDS/ÂΜL (ref 1.85–7.62)
NEUTS SEG NFR BLD AUTO: 63 % (ref 43–75)
NRBC BLD AUTO-RTO: 0 /100 WBCS
P AXIS: 53 DEGREES
PLATELET # BLD AUTO: 214 THOUSANDS/UL (ref 149–390)
PMV BLD AUTO: 12.8 FL (ref 8.9–12.7)
POTASSIUM SERPL-SCNC: 3.9 MMOL/L (ref 3.5–5.3)
PR INTERVAL: 124 MS
PROT SERPL-MCNC: 8 G/DL (ref 6.4–8.4)
QRS AXIS: 54 DEGREES
QRSD INTERVAL: 90 MS
QT INTERVAL: 400 MS
QTC INTERVAL: 379 MS
RBC # BLD AUTO: 4.67 MILLION/UL (ref 3.88–5.62)
SODIUM SERPL-SCNC: 138 MMOL/L (ref 135–147)
T WAVE AXIS: 53 DEGREES
VENTRICULAR RATE: 54 BPM
WBC # BLD AUTO: 9.67 THOUSAND/UL (ref 4.31–10.16)

## 2024-07-18 PROCEDURE — 36415 COLL VENOUS BLD VENIPUNCTURE: CPT

## 2024-07-18 PROCEDURE — G0382 LEV 3 HOSP TYPE B ED VISIT: HCPCS

## 2024-07-18 PROCEDURE — 72100 X-RAY EXAM L-S SPINE 2/3 VWS: CPT

## 2024-07-18 PROCEDURE — 99283 EMERGENCY DEPT VISIT LOW MDM: CPT

## 2024-07-18 PROCEDURE — 99284 EMERGENCY DEPT VISIT MOD MDM: CPT

## 2024-07-18 PROCEDURE — 93005 ELECTROCARDIOGRAM TRACING: CPT

## 2024-07-18 PROCEDURE — 83735 ASSAY OF MAGNESIUM: CPT | Performed by: EMERGENCY MEDICINE

## 2024-07-18 PROCEDURE — 84484 ASSAY OF TROPONIN QUANT: CPT | Performed by: EMERGENCY MEDICINE

## 2024-07-18 PROCEDURE — 93010 ELECTROCARDIOGRAM REPORT: CPT | Performed by: INTERNAL MEDICINE

## 2024-07-18 PROCEDURE — 80053 COMPREHEN METABOLIC PANEL: CPT | Performed by: EMERGENCY MEDICINE

## 2024-07-18 PROCEDURE — 71045 X-RAY EXAM CHEST 1 VIEW: CPT

## 2024-07-18 PROCEDURE — 96361 HYDRATE IV INFUSION ADD-ON: CPT

## 2024-07-18 PROCEDURE — 85025 COMPLETE CBC W/AUTO DIFF WBC: CPT | Performed by: EMERGENCY MEDICINE

## 2024-07-18 PROCEDURE — 99285 EMERGENCY DEPT VISIT HI MDM: CPT | Performed by: EMERGENCY MEDICINE

## 2024-07-18 RX ORDER — LIDOCAINE 50 MG/G
1 PATCH TOPICAL ONCE
Status: DISCONTINUED | OUTPATIENT
Start: 2024-07-18 | End: 2024-07-19 | Stop reason: HOSPADM

## 2024-07-18 RX ORDER — ACETAMINOPHEN 325 MG/1
650 TABLET ORAL ONCE
Status: COMPLETED | OUTPATIENT
Start: 2024-07-18 | End: 2024-07-18

## 2024-07-18 RX ADMIN — ACETAMINOPHEN 650 MG: 325 TABLET, FILM COATED ORAL at 22:35

## 2024-07-18 RX ADMIN — SODIUM CHLORIDE 1000 ML: 0.9 INJECTION, SOLUTION INTRAVENOUS at 22:37

## 2024-07-18 RX ADMIN — LIDOCAINE 1 PATCH: 50 PATCH CUTANEOUS at 22:36

## 2024-07-18 NOTE — Clinical Note
Yusef Ferrer was seen and treated in our emergency department on 7/18/2024.                Diagnosis:     Yusef  may return to work on return date.    He may return on this date: 07/22/2024         If you have any questions or concerns, please don't hesitate to call.      Mary Jane Levine, DO    ______________________________           _______________          _______________  Hospital Representative                              Date                                Time

## 2024-07-19 VITALS
TEMPERATURE: 98.8 F | SYSTOLIC BLOOD PRESSURE: 115 MMHG | DIASTOLIC BLOOD PRESSURE: 59 MMHG | RESPIRATION RATE: 18 BRPM | WEIGHT: 145.94 LBS | OXYGEN SATURATION: 99 % | BODY MASS INDEX: 21.55 KG/M2 | HEART RATE: 58 BPM

## 2024-07-19 PROCEDURE — 96374 THER/PROPH/DIAG INJ IV PUSH: CPT

## 2024-07-19 RX ORDER — LIDOCAINE 50 MG/G
1 PATCH TOPICAL DAILY
Qty: 4 PATCH | Refills: 0 | Status: SHIPPED | OUTPATIENT
Start: 2024-07-19 | End: 2024-07-23

## 2024-07-19 RX ORDER — METHOCARBAMOL 500 MG/1
500 TABLET, FILM COATED ORAL ONCE
Status: COMPLETED | OUTPATIENT
Start: 2024-07-19 | End: 2024-07-19

## 2024-07-19 RX ORDER — KETOROLAC TROMETHAMINE 30 MG/ML
30 INJECTION, SOLUTION INTRAMUSCULAR; INTRAVENOUS ONCE
Status: COMPLETED | OUTPATIENT
Start: 2024-07-19 | End: 2024-07-19

## 2024-07-19 RX ORDER — METHOCARBAMOL 500 MG/1
500 TABLET, FILM COATED ORAL 2 TIMES DAILY PRN
Qty: 8 TABLET | Refills: 0 | Status: SHIPPED | OUTPATIENT
Start: 2024-07-19 | End: 2024-07-23

## 2024-07-19 RX ADMIN — KETOROLAC TROMETHAMINE 30 MG: 30 INJECTION, SOLUTION INTRAMUSCULAR; INTRAVENOUS at 00:06

## 2024-07-19 RX ADMIN — METHOCARBAMOL 500 MG: 500 TABLET ORAL at 00:06

## 2024-07-19 NOTE — ED PROVIDER NOTES
"History  Chief Complaint   Patient presents with   • Syncope     Pt states passing out a few times at work today. Patient was unloading pallets. Pt states cans fell on him and is c/o some lower back pain. Pt a/o x4. Denies dizziness, n/v.     Patient is a 28-year-old male Green[place]ic-speaking only here with wife who provides translation.  Did offer [employer] [facility] but patient wishes for his wife to .  Patient is otherwise healthy.  Patient is a  and works odd hours per wife.  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  Patient does state that he was not eating anything before he got up to [employer].  He was moving pallets off the truck and was on his last 1 when he felt dizzy and not well and had a syncopal event.  He landed on the bed of the truck but did not hit his head.  There were witnesses who called EMS.  Patient went to stand up and became dizzy nauseated and had a syncopal event again.  Staff around him at that time helped assist him down to a bathroom where he passed out but was caught.  He has had no preceding chest pain, shortness of breath, palpitations.  He has had no fevers, vomiting, diarrhea, melena, bright red blood per rectum.  Wife states that he told her EMS was called.  Vital signs were \"normal\" and they checked glucose.  Patient was given food and drinks.  His job did give obtain a ride home for him.  Since that time he has been tolerating p.o. well.  On my exam patient has some back discomfort after stating that when he passed out a bunch of a display of cans fell on him\".  He has no urinary retention or saddle anesthesia.  No loss of bowel or bladder.  No focal weakness and or paresthesias throughout the bilateral lower extremities      History provided by:  Patient, medical records and spouse   used: Yes (spouse)    Syncope  Episode history:  Multiple  Most recent episode:  Today  Progression:  Resolved  Chronicity:  " New  Context: standing up    Context: not blood draw, not bowel movement, not dehydration, not exertion, not inactivity, not medication change, not with normal activity, not sight of blood, not sitting down and not urination    Witnessed: yes    Relieved by:  Nothing  Worsened by:  Nothing  Ineffective treatments:  None tried  Associated symptoms: nausea    Associated symptoms: no anxiety, no chest pain, no confusion, no diaphoresis, no difficulty breathing, no dizziness, no fever, no focal sensory loss, no focal weakness, no headaches, no malaise/fatigue, no palpitations, no recent fall, no recent injury, no recent surgery, no rectal bleeding, no seizures, no shortness of breath, no visual change, no vomiting and no weakness    Risk factors: no congenital heart disease, no coronary artery disease, no seizures and no vascular disease        Prior to Admission Medications   Prescriptions Last Dose Informant Patient Reported? Taking?   ondansetron (Zofran ODT) 4 mg disintegrating tablet   No No   Sig: Take 1 tablet (4 mg total) by mouth every 6 (six) hours as needed for nausea or vomiting      Facility-Administered Medications: None       No past medical history on file.    No past surgical history on file.    No family history on file.  I have reviewed and agree with the history as documented.    E-Cigarette/Vaping     E-Cigarette/Vaping Substances     Social History     Tobacco Use   • Smoking status: Never   • Smokeless tobacco: Never   Substance Use Topics   • Drug use: Never       Review of Systems   Constitutional: Negative.  Negative for chills, diaphoresis, fever and malaise/fatigue.   HENT: Negative.  Negative for ear pain and sore throat.    Eyes:  Negative for pain and visual disturbance.   Respiratory: Negative.  Negative for cough and shortness of breath.    Cardiovascular:  Positive for syncope. Negative for chest pain and palpitations.   Gastrointestinal:  Positive for nausea. Negative for abdominal  pain and vomiting.   Genitourinary: Negative.  Negative for dysuria and hematuria.   Musculoskeletal: Negative.  Negative for arthralgias and back pain.   Skin:  Negative for color change and rash.   Neurological:  Negative for dizziness, focal weakness, seizures, syncope, weakness and headaches.   Hematological: Negative.    Psychiatric/Behavioral: Negative.  Negative for confusion.    All other systems reviewed and are negative.      Physical Exam  Physical Exam  Vitals and nursing note reviewed.   Constitutional:       General: He is awake. He is not in acute distress.     Appearance: Normal appearance. He is well-developed and normal weight.   HENT:      Head: Normocephalic and atraumatic.      Right Ear: External ear normal.      Left Ear: External ear normal.      Nose: Nose normal.      Mouth/Throat:      Mouth: Mucous membranes are dry.      Comments: Patient maintaining airway and secretions. No stridor . No brawniness under tongue.     Dry MM    Eyes:      General: Lids are normal. Vision grossly intact.      Extraocular Movements: Extraocular movements intact.      Conjunctiva/sclera: Conjunctivae normal.      Pupils: Pupils are equal, round, and reactive to light.   Neck:      Trachea: Trachea normal.   Cardiovascular:      Rate and Rhythm: Regular rhythm. Bradycardia present.      Pulses:           Radial pulses are 2+ on the right side and 2+ on the left side.        Dorsalis pedis pulses are 2+ on the right side and 2+ on the left side.      Heart sounds: Normal heart sounds, S1 normal and S2 normal. No murmur heard.  Pulmonary:      Effort: Pulmonary effort is normal. No respiratory distress.      Breath sounds: Normal breath sounds.   Abdominal:      Palpations: Abdomen is soft.      Tenderness: There is no abdominal tenderness.   Musculoskeletal:         General: No swelling.      Cervical back: Normal range of motion and neck supple.        Back:       Right lower leg: No edema.      Left lower  leg: No edema.   Lymphadenopathy:      Cervical: No cervical adenopathy.   Skin:     General: Skin is warm and dry.      Capillary Refill: Capillary refill takes less than 2 seconds.   Neurological:      General: No focal deficit present.      Mental Status: He is alert and oriented to person, place, and time.      GCS: GCS eye subscore is 4. GCS verbal subscore is 5. GCS motor subscore is 6.      Cranial Nerves: Cranial nerves 2-12 are intact.      Sensory: Sensation is intact.      Motor: Motor function is intact.      Coordination: Coordination is intact.      Comments: No slurred speech.  No facial asymmetry.  No tongue deviation.  Negative pronator drift   Psychiatric:         Mood and Affect: Mood normal.         Behavior: Behavior normal. Behavior is cooperative.       Vital Signs  ED Triage Vitals   Temperature Pulse Respirations Blood Pressure SpO2   07/18/24 2116 07/18/24 2116 07/18/24 2116 07/18/24 2116 07/18/24 2116   98.8 °F (37.1 °C) (!) 54 18 123/64 99 %      Temp Source Heart Rate Source Patient Position - Orthostatic VS BP Location FiO2 (%)   07/18/24 2116 07/18/24 2116 07/18/24 2116 07/18/24 2116 --   Oral Monitor Sitting Right arm       Pain Score       07/18/24 2235       4           Vitals:    07/18/24 2116 07/18/24 2224 07/18/24 2228 07/19/24 0011   BP: 123/64 112/65 136/78 115/59   Pulse: (!) 54 (!) 48 59 58   Patient Position - Orthostatic VS: Sitting Lying - Orthostatic VS Standing - Orthostatic VS          Visual Acuity  Visual Acuity      Flowsheet Row Most Recent Value   L Pupil Size (mm) 3   R Pupil Size (mm) 3            ED Medications  Medications   lidocaine (LIDODERM) 5 % patch 1 patch (1 patch Topical Medication Applied 7/18/24 2236)   acetaminophen (TYLENOL) tablet 650 mg (650 mg Oral Given 7/18/24 2235)   sodium chloride 0.9 % bolus 1,000 mL (0 mL Intravenous Stopped 7/18/24 2337)   ketorolac (TORADOL) injection 30 mg (30 mg Intravenous Given 7/19/24 0006)   methocarbamol  (ROBAXIN) tablet 500 mg (500 mg Oral Given 7/19/24 0006)       Diagnostic Studies  Results Reviewed       Procedure Component Value Units Date/Time    Comprehensive metabolic panel [[id number]] Collected: 07/18/24 2207    Lab Status: Final result Specimen: Blood from Arm, Right Updated: 07/18/24 2241     Sodium 138 mmol/L      Potassium 3.9 mmol/L      Chloride 104 mmol/L      CO2 28 mmol/L      ANION GAP 6 mmol/L      BUN 15 mg/dL      Creatinine 1.02 mg/dL      Glucose 95 mg/dL      Calcium 9.2 mg/dL      AST 20 U/L      ALT 12 U/L      Alkaline Phosphatase 77 U/L      Total Protein 8.0 g/dL      Albumin 4.4 g/dL      Total Bilirubin 0.40 mg/dL      eGFR 98 ml/min/1.73sq m     Narrative:      National Kidney Disease Foundation guidelines for Chronic Kidney Disease (CKD):   •  Stage 1 with normal or high GFR (GFR > 90 mL/min/1.73 square meters)  •  Stage 2 Mild CKD (GFR = 60-89 mL/min/1.73 square meters)  •  Stage 3A Moderate CKD (GFR = 45-59 mL/min/1.73 square meters)  •  Stage 3B Moderate CKD (GFR = 30-44 mL/min/1.73 square meters)  •  Stage 4 Severe CKD (GFR = 15-29 mL/min/1.73 square meters)  •  Stage 5 End Stage CKD (GFR <15 mL/min/1.73 square meters)  Note: GFR calculation is accurate only with a steady state creatinine    Magnesium [[id number]]  (Normal) Collected: 07/18/24 2207    Lab Status: Final result Specimen: Blood from Arm, Right Updated: 07/18/24 2241     Magnesium 2.2 mg/dL     HS Troponin 0hr (reflex protocol) [[id number]]  (Normal) Collected: 07/18/24 2207    Lab Status: Final result Specimen: Blood from Arm, Right Updated: 07/18/24 2236     hs TnI 0hr <2 ng/L     CBC and differential [[id number]]  (Abnormal) Collected: 07/18/24 2207    Lab Status: Final result Specimen: Blood from Arm, Right Updated: 07/18/24 2214     WBC 9.67 Thousand/uL      RBC 4.67 Million/uL      Hemoglobin 14.2 g/dL      Hematocrit 41.7 %      MCV 89 fL      MCH 30.4 pg      MCHC 34.1 g/dL      RDW 12.1 %      MPV 12.8 fL       Platelets 214 Thousands/uL      nRBC 0 /100 WBCs      Segmented % 63 %      Immature Grans % 0 %      Lymphocytes % 30 %      Monocytes % 7 %      Eosinophils Relative 0 %      Basophils Relative 0 %      Absolute Neutrophils 5.93 Thousands/µL      Absolute Immature Grans 0.03 Thousand/uL      Absolute Lymphocytes 2.92 Thousands/µL      Absolute Monocytes 0.71 Thousand/µL      Eosinophils Absolute 0.04 Thousand/µL      Basophils Absolute 0.04 Thousands/µL                    XR spine lumbar 2 or 3 views injury   ED Interpretation by Mary Jane Levine DO (07/18 2317)   Poorly visualized T12 however no obvious fracture or dislocation.      XR chest 1 view portable   ED Interpretation by Mary Jane Levine DO (07/18 2317)   No acute findings      Final Result by Puja Baeza MD (07/18 2319)      No acute cardiopulmonary disease.            Workstation performed: [id number]                    Procedures  ECG 12 Lead Documentation Only    Date/Time: 7/18/2024 10:34 PM    Performed by: Mary Jane Levine DO  Authorized by: Mary Jane Levine DO    Indications / Diagnosis:  Syncope  ECG reviewed by me, the ED Provider: yes    Patient location:  ED  Previous ECG:     Previous ECG:  Unavailable  Interpretation:     Interpretation: normal    Quality:     Tracing quality:  Limited by artifact  Rate:     ECG rate:  54    ECG rate assessment: bradycardic    Rhythm:     Rhythm: sinus bradycardia    Ectopy:     Ectopy: none    QRS:     QRS axis:  Normal    QRS intervals:  Normal  Conduction:     Conduction: normal    ST segments:     ST segments:  Non-specific  T waves:     T waves: non-specific    Comments:      Normal axis  QTC @ 379 ms           ED Course  ED Course as of 07/19/24 0023   Thu Jul 18, 2024   2232 Patient is a 28-year-old male here with wife who provides history as patient is Japanese-speaking only coming in today after 3 syncopal events while at work earlier this morning.  On exam he is resting in bed in no  "acute distress hemodynamically stable.  He is bradycardic however asymptomatic.  Will review chart, provide IV fluids as well as obtain lumbar spine x-ray due to injury at work.  Also start cardiac workup    Disclosure: Voice to text software was used in the preparation of this document and could have resulted in translational errors.      Occasional wrong word or \"sound a like\" substitutions may have occurred due to the inherent limitations of voice recognition software.  Read the chart carefully and recognize, using context, where substitutions have occurred.       I have independently reviewed external records are available to me to the level of detail possible within the time constraints of my patient care responsibilities in the ED.       6650 Patient sleeping.  Upon discussion with wife patient's back pain is improved however not resolved.  However he is resting in bed in no distress.  He is bradycardic but asymptomatic.      On discussion with patient and wife to follow-up with PCP and to increase his water intake.  Will give note for work as referral to family practice physician.  Return to ER instructions given as well.      Discussed with wife that this is likely vasovagal in setting of exertion with dehydration.      Counseling: I had a detailed discussion with the patient and/or guardian regarding: the historical points, exam findings, and any diagnostic results supporting the discharge diagnosis, lab results, radiology results, discharge instructions reviewed with patient and/or family/caregiver and understanding was verbalized. Instructions given to return to the emergency department if symptoms worsen or persist, or if there are any questions or concerns that arise at home.     All imaging and/or lab testing discussed with patient, strict return to ED precautions discussed. Patient recommended to follow up promptly with appropriate outpatient provider. Patient and/or family members verbalizes " understanding and agrees with plan. Patient and/or family members were given opportunity to ask questions, all questions were answered at this time. Patient is stable for discharge                   HEART Risk Score      Flowsheet Row Most Recent Value   Heart Score Risk Calculator    History 0 Filed at: 07/18/2024 2251   ECG 1 Filed at: 07/18/2024 2251   Age 0 Filed at: 07/18/2024 2251   Risk Factors 0 Filed at: 07/18/2024 2251   Troponin 0 Filed at: 07/18/2024 2251   HEART Score 1 Filed at: 07/18/2024 2251                  PERC Rule for PE      Flowsheet Row Most Recent Value   PERC Rule for PE    Age >=50 0 Filed at: 07/18/2024 2252   HR >=100 0 Filed at: 07/18/2024 2252   O2 Sat on room air < 95% 0 Filed at: 07/18/2024 2252   History of PE or DVT 0 Filed at: 07/18/2024 2252   Recent trauma or surgery 0 Filed at: 07/18/2024 2252   Hemoptysis 0 Filed at: 07/18/2024 2252   Exogenous estrogen 0 Filed at: 07/18/2024 2252   Unilateral leg swelling 0 Filed at: 07/18/2024 2252   PERC Rule for PE Results 0 Filed at: 07/18/2024 2252                SBIRT 20yo+      Flowsheet Row Most Recent Value   Initial Alcohol Screen: US AUDIT-C     1. How often do you have a drink containing alcohol? 1 Filed at: 07/18/2024 2116   2. How many drinks containing alcohol do you have on a typical day you are drinking?  0 Filed at: 07/18/2024 2116   3a. Male UNDER 65: How often do you have five or more drinks on one occasion? 0 Filed at: 07/18/2024 2116   Audit-C Score 1 Filed at: 07/18/2024 2116   SEKOU: How many times in the past year have you...    Used an illegal drug or used a prescription medication for non-medical reasons? Never Filed at: 07/18/2024 2116            Wells' Criteria for PE      Flowsheet Row Most Recent Value   Wells' Criteria for PE    Clinical signs and symptoms of DVT 0 Filed at: 07/18/2024 2252   PE is primary diagnosis or equally likely 0 Filed at: 07/18/2024 2252   HR >100 0 Filed at: 07/18/2024 2252    Immobilization at least 3 days or Surgery in the previous 4 weeks 0 Filed at: 07/18/2024 2252   Previous, objectively diagnosed PE or DVT 0 Filed at: 07/18/2024 2252   Hemoptysis 0 Filed at: 07/18/2024 2252   Malignancy with treatment within 6 months or palliative 0 Filed at: 07/18/2024 2252   Wells' Criteria Total 0 Filed at: 07/18/2024 2252                  Medical Decision Making      Differential diagnosis includes but not limited to: Arrhythmia, electrolyte disturbance, vasovagal, obstructive cardiomyopathy, saddle PE, PE, aortic dissection, pneumonia, valvular disorder, depletion, alcohol, toxicologic, seizure, hypertension, psychogenic    Orthostatics noted and asymptomatic    Problems Addressed:  Syncope: acute illness or injury    Amount and/or Complexity of Data Reviewed  Independent Historian: spouse     Details: Wife at bedside  Labs: ordered. Decision-making details documented in ED Course.     Details: No anemia, thrombocytopenia or leukocytosis  No acute kidney injury or electrolyte dysfunction  Troponin less than 2 with a heart score less than 3  Radiology: ordered and independent interpretation performed. Decision-making details documented in ED Course.     Details: Chest x-ray portable on my read shows no acute findings    Lumbar spine x-ray poor penetration of T12 however no fracture or dislocation  ECG/medicine tests: ordered and independent interpretation performed. Decision-making details documented in ED Course.     Details: No ischemia or arrhythmia.  Noted bradycardia    Risk  OTC drugs.  Prescription drug management.               Disposition  Final diagnoses:   Syncope   Bradycardia   Low back pain     Time reflects when diagnosis was documented in both MDM as applicable and the Disposition within this note       Time User Action Codes Description Comment    7/18/2024 11:17 PM BendMary Jane [email or web address] Add [R55] Syncope     7/18/2024 11:17 PM Bendren Mary Jane L Add [R00.1] Bradycardia      7/19/2024 12:00 AM Mary Jane Levine [M54.50] Low back pain           ED Disposition       ED Disposition   Discharge    Condition   Stable    Date/Time   Fri Jul 19, 2024 0002    Comment   Yusef Ferrer discharge to home/self care.                   Follow-up Information       Follow up With Specialties Details Why Contact Info Additional Information    [facility] Medicine Call in 1 week  [address] [place] [id number] [phone] [facility] [place], [address] [place] [address], [place], [place], [address] [phone]            Patient's Medications   Discharge Prescriptions    LIDOCAINE (LIDODERM) 5 %    Apply 1 patch topically over 12 hours daily for 4 days Remove & Discard patch within 12 hours or as directed by MD       Start Date: 7/19/2024 End Date: 7/23/2024       Order Dose: 1 patch       Quantity: 4 patch    Refills: 0    METHOCARBAMOL (ROBAXIN) 500 MG TABLET    Take 1 tablet (500 mg total) by mouth 2 (two) times a day as needed for muscle spasms for up to 4 days       Start Date: 7/19/2024 End Date: 7/23/2024       Order Dose: 500 mg       Quantity: 8 tablet    Refills: 0           PDMP Review       None            ED Provider  Electronically Signed by             Mary Jane Levine DO  07/19/24 0033

## 2024-07-19 NOTE — DISCHARGE INSTRUCTIONS
Kennedy análisis no reveló ningún hallazgo pedro.  Es importante que aumente kennedy ingesta de agua, especialmente bisi el trabajo y el esfuerzo, así marisol que coma antes del trabajo.    Llame para programar filemon maico con el médico de beth.  Si tiene algún evento sincopal repetido o se desmaya, regrese a la minerva de emergencias